# Patient Record
Sex: MALE | Race: BLACK OR AFRICAN AMERICAN | NOT HISPANIC OR LATINO | Employment: OTHER | URBAN - METROPOLITAN AREA
[De-identification: names, ages, dates, MRNs, and addresses within clinical notes are randomized per-mention and may not be internally consistent; named-entity substitution may affect disease eponyms.]

---

## 2018-05-08 ENCOUNTER — HISTORICAL (OUTPATIENT)
Dept: ADMINISTRATIVE | Facility: HOSPITAL | Age: 23
End: 2018-05-08

## 2018-05-08 LAB
BILIRUB SERPL-MCNC: NEGATIVE MG/DL
BLOOD URINE, POC: NEGATIVE
CLARITY, POC UA: CLEAR
COLOR, POC UA: NORMAL
GLUCOSE UR QL STRIP: NEGATIVE
HAV IGM SERPL QL IA: NONREACTIVE
HBV CORE IGM SERPL QL IA: NONREACTIVE
HBV SURFACE AG SERPL QL IA: NEGATIVE
HCV AB SERPL QL IA: NONREACTIVE
HIV 1+2 AB+HIV1 P24 AG SERPL QL IA: NONREACTIVE
KETONES UR QL STRIP: NEGATIVE
LEUKOCYTE EST, POC UA: NEGATIVE
NITRITE, POC UA: NEGATIVE
PH, POC UA: 5.5
PROTEIN, POC: NORMAL
SPECIFIC GRAVITY, POC UA: 1.03
T PALLIDUM AB SER QL: NONREACTIVE
UROBILINOGEN, POC UA: NORMAL

## 2018-08-21 ENCOUNTER — HISTORICAL (OUTPATIENT)
Dept: ADMINISTRATIVE | Facility: HOSPITAL | Age: 23
End: 2018-08-21

## 2018-08-21 LAB
APPEARANCE, UA: CLEAR
BACTERIA #/AREA URNS AUTO: ABNORMAL /[HPF]
BILIRUB SERPL-MCNC: NEGATIVE MG/DL
BILIRUB UR QL STRIP: NEGATIVE
BLOOD URINE, POC: NORMAL
CLARITY, POC UA: CLEAR
COLOR UR: YELLOW
COLOR, POC UA: YELLOW
GLUCOSE (UA): NORMAL
GLUCOSE UR QL STRIP: NEGATIVE
HAV IGM SERPL QL IA: NONREACTIVE
HBV CORE IGM SERPL QL IA: NONREACTIVE
HBV SURFACE AG SERPL QL IA: NEGATIVE
HCV AB SERPL QL IA: NONREACTIVE
HGB UR QL STRIP: NEGATIVE
HIV 1+2 AB+HIV1 P24 AG SERPL QL IA: NONREACTIVE
HYALINE CASTS #/AREA URNS LPF: ABNORMAL /[LPF]
KETONES UR QL STRIP: NEGATIVE
KETONES UR QL STRIP: NEGATIVE
LEUKOCYTE EST, POC UA: NEGATIVE
LEUKOCYTE ESTERASE UR QL STRIP: NEGATIVE
NITRITE UR QL STRIP: NEGATIVE
NITRITE, POC UA: NEGATIVE
PH UR STRIP: 5.5 [PH] (ref 4.5–8)
PH, POC UA: 6
PROT UR QL STRIP: 10 MG/DL
PROTEIN, POC: NEGATIVE
RBC #/AREA URNS AUTO: ABNORMAL /[HPF]
SP GR UR STRIP: 1.03 (ref 1–1.03)
SPECIFIC GRAVITY, POC UA: 1.03
SQUAMOUS #/AREA URNS LPF: ABNORMAL /[LPF]
T PALLIDUM AB SER QL: NONREACTIVE
UROBILINOGEN UR STRIP-ACNC: NORMAL
UROBILINOGEN, POC UA: NORMAL
WBC #/AREA URNS AUTO: ABNORMAL /HPF

## 2019-07-14 ENCOUNTER — HISTORICAL (OUTPATIENT)
Dept: ADMINISTRATIVE | Facility: HOSPITAL | Age: 24
End: 2019-07-14

## 2019-07-14 LAB
APPEARANCE, UA: CLEAR
BACTERIA #/AREA URNS AUTO: ABNORMAL /[HPF]
BILIRUB SERPL-MCNC: NEGATIVE MG/DL
BILIRUB UR QL STRIP: NEGATIVE
BLOOD URINE, POC: NORMAL
CLARITY, POC UA: NORMAL
COLOR UR: YELLOW
COLOR, POC UA: YELLOW
GLUCOSE (UA): NORMAL
GLUCOSE UR QL STRIP: NEGATIVE
HAV IGM SERPL QL IA: NONREACTIVE
HBV CORE IGM SERPL QL IA: NONREACTIVE
HBV SURFACE AG SERPL QL IA: NEGATIVE
HCV AB SERPL QL IA: NONREACTIVE
HGB UR QL STRIP: NEGATIVE
HIV 1+2 AB+HIV1 P24 AG SERPL QL IA: NONREACTIVE
HYALINE CASTS #/AREA URNS LPF: ABNORMAL /[LPF]
KETONES UR QL STRIP: NEGATIVE
KETONES UR QL STRIP: NORMAL
LEUKOCYTE EST, POC UA: NEGATIVE
LEUKOCYTE ESTERASE UR QL STRIP: NEGATIVE
NITRITE UR QL STRIP: NEGATIVE
NITRITE, POC UA: NEGATIVE
PH UR STRIP: 7 [PH] (ref 4.5–8)
PH, POC UA: 7
PROT UR QL STRIP: 20 MG/DL
PROTEIN, POC: NORMAL
RBC #/AREA URNS AUTO: ABNORMAL /[HPF]
RPR SER QL: REACTIVE
SP GR UR STRIP: 1.02 (ref 1–1.03)
SPECIFIC GRAVITY, POC UA: 1.02
SQUAMOUS #/AREA URNS LPF: ABNORMAL /[LPF]
T PALLIDUM AB SER QL: REACTIVE
UROBILINOGEN UR STRIP-ACNC: NORMAL
UROBILINOGEN, POC UA: NORMAL
WBC #/AREA URNS AUTO: ABNORMAL /HPF

## 2019-07-16 LAB — FINAL CULTURE: NO GROWTH

## 2019-10-01 ENCOUNTER — HISTORICAL (OUTPATIENT)
Dept: ADMINISTRATIVE | Facility: HOSPITAL | Age: 24
End: 2019-10-01

## 2019-10-01 LAB
BILIRUB SERPL-MCNC: NEGATIVE MG/DL
BLOOD URINE, POC: NORMAL
CLARITY, POC UA: CLEAR
COLOR, POC UA: YELLOW
GLUCOSE UR QL STRIP: NEGATIVE
KETONES UR QL STRIP: NEGATIVE
LEUKOCYTE EST, POC UA: NEGATIVE
NITRITE, POC UA: NEGATIVE
PH, POC UA: 5.5
PROTEIN, POC: NEGATIVE
RPR SER QL: REACTIVE
SPECIFIC GRAVITY, POC UA: 1.03
T PALLIDUM AB SER QL: REACTIVE

## 2019-12-17 ENCOUNTER — HISTORICAL (OUTPATIENT)
Dept: ADMINISTRATIVE | Facility: HOSPITAL | Age: 24
End: 2019-12-17

## 2019-12-17 LAB
BILIRUB SERPL-MCNC: NEGATIVE MG/DL
BLOOD URINE, POC: NEGATIVE
CLARITY, POC UA: CLEAR
COLOR, POC UA: COLORLESS
GLUCOSE UR QL STRIP: NEGATIVE
HAV IGM SERPL QL IA: NONREACTIVE
HBV CORE IGM SERPL QL IA: NONREACTIVE
HBV SURFACE AG SERPL QL IA: NEGATIVE
HCV AB SERPL QL IA: NONREACTIVE
HIV 1+2 AB+HIV1 P24 AG SERPL QL IA: NONREACTIVE
KETONES UR QL STRIP: NEGATIVE
LEUKOCYTE EST, POC UA: NEGATIVE
NITRITE, POC UA: NEGATIVE
PH, POC UA: 6
PROTEIN, POC: NEGATIVE
RPR SER QL: REACTIVE
SPECIFIC GRAVITY, POC UA: 1
T PALLIDUM AB SER QL: REACTIVE
UROBILINOGEN, POC UA: NORMAL

## 2021-12-20 ENCOUNTER — HISTORICAL (OUTPATIENT)
Dept: ADMINISTRATIVE | Facility: HOSPITAL | Age: 26
End: 2021-12-20

## 2021-12-20 LAB — SARS-COV-2 RNA RESP QL NAA+PROBE: NOT DETECTED

## 2022-04-11 ENCOUNTER — HISTORICAL (OUTPATIENT)
Dept: ADMINISTRATIVE | Facility: HOSPITAL | Age: 27
End: 2022-04-11

## 2022-04-29 VITALS
DIASTOLIC BLOOD PRESSURE: 66 MMHG | OXYGEN SATURATION: 99 % | SYSTOLIC BLOOD PRESSURE: 122 MMHG | WEIGHT: 174.19 LBS | SYSTOLIC BLOOD PRESSURE: 117 MMHG | BODY MASS INDEX: 26.22 KG/M2 | DIASTOLIC BLOOD PRESSURE: 75 MMHG | BODY MASS INDEX: 25.8 KG/M2 | HEIGHT: 69 IN | WEIGHT: 177 LBS | HEIGHT: 69 IN

## 2022-05-05 NOTE — HISTORICAL [facility]ER
This is a historical note converted from [employer]. Formatting and pictures may have been removed.  Please reference [employer] for original formatting and attached multimedia. Chief Complaint  Patient is c/o burning when he urination x days  History of Present Illness  23 year old BM with complaints of urethral irritation and dysuria x 2-3 days. No urethral discharge, genital skin lesions, scrotal/testicular pain. Denies abdominal or back pain. NO fever or chills. Mild achiness reported. No hematuria.  Patient having unprotected intercourse with 3 females. Symptoms started morning after last sexual encounter. No prior hx of STDs reported.  Review of Systems  GENERAL: Negative except as stated?in HPI  CV: Negative except as stated in HPI  RESP: Negative except as stated?in HPI  GI: Negative?except as stated in?HPI  : Negative?except as stated in?HPI  SKIN: Negative?except as stated in?HPI  Neuro: Negative?except as stated in?HPI  MS: Negative?except as stated in?HPI  Psych: Negative?except as stated in?HPI  Physical Exam  Vitals & Measurements  T:?36.7? ?C (Oral)? HR:?79(Peripheral)? BP:?122/66?  HT:?176?cm? WT:?79?kg? BMI:?25.5?  Vital Signs reviewed  GENERAL: Alert and oriented; no acute distress.  Head: Normocephalic, atraumatic.  Eyes: Pupils are round,?equal and?reactive to light. Extraocular movements intact. No exophthalmos. Sclera non-icteric.  Neck: Supple, nontender.? No lymphadenopathy.  CV: Normal rate and rhythm. No murmurs, rubs or gallops. ?No edema. Normal peripheral pulses and perfusion.  RESP: Respirations even and nonlabored.  GI: Abdomen soft and non distended. Normoactive bowel sounds x 4 quadrants. No tenderness with palpation.  MUSCULOSKELETAL: Full passive and active ROM of all joints. No bony deformities,?joint tenderness or swelling.?No CVA tenderness.  NEURO: Awake, alert and oriented to person, place, time and situation. Cranial nerves II-XII grossly intact.?No focal or sensory deficits  noted.  INTEGUMENTARY: Skin warm, dry, and intact. No rashes or?unusual bruising.  PSYCH: Appropriate mood and affect; cooperative.  Assessment/Plan  1.?Dysuria?R30.0  Urinalysis with?trace protein, 0-2 WBC and RBC?otherwise negative; urine C&S pending.?? Urine for?gonorrhea and chlamydia, HIV, RPR, Hepatitis panel pending; clinic will notify patient of these results. ?Zithromax 250 mg (mix with plain 1% lido) IM x 1; Zithromax 1 gram PO given in clinic. Patient tolerated well without adverse reaction. Abstain from sexual intercourse until all testing is back. Safe sex practices always. Follow up as needed.  Ordered:  Chlamydia trach and N. gonorrhea PCR, Stat collect, Urine, Collected, 07/14/19 14:12:00 CDT, Stop date 07/14/19 14:12:00 CDT, Nurse collect, Dysuria  [facility], 07/14/19 14:12:00 CDT, Blood, Collected, Stop date 07/14/19 14:12:00 CDT, Nurse collect, Dysuria, 07/14/19 14:12:00 CDT  HIV 1 and 2, Stat collect, 07/14/19 14:12:00 CDT, Blood, Collected, Stop date 07/14/19 14:12:00 CDT, Nurse collect, Dysuria, 07/14/19 14:12:00 CDT  Syphilis Antibody (with Reflex RPR), Stat collect, 07/14/19 14:12:00 CDT, Blood, Collected, Stop date 07/14/19 14:12:00 CDT, Nurse collect, Dysuria, 07/14/19 14:12:00 CDT  Urine Culture [id number], Stat collect, 07/14/19 14:13:00 CDT, Urine, Nurse collect, Stop date 07/14/19 14:13:00 CDT, Dysuria  ?  Orders:  injections IM/SQ, 07/14/19 14:32:00 CDT, 07/14/19 14:32:00 CDT   Problem List/Past Medical History  Ongoing  Tobacco user  Historical  No qualifying data  Medications  Flonase 50 mcg/inh nasal spray, 1 spray(s), Nasal, Daily,? ?Not taking  Allergies  No Known Allergies  Social History  Abuse/Neglect  No, No, 07/14/2019  Alcohol - Denies Alcohol Use, 01/26/2014  Current, 03/27/2019  Current, Beer, Wine, Liquor, 3-5 times per week, 05/08/2018  Substance Use - Denies Substance Abuse, 01/26/2014  Current, Marijuana, 03/27/2019  Past, 08/21/2018  Tobacco -  Medium Risk, 01/26/2014  4 or less cigarettes(less than 1/4 pack)/day in last 30 days, No, 07/14/2019  Never (less than 100 in lifetime), N/A, 03/27/2019  Current every day smoker, Cigarettes, 10 per day., 05/08/2018  Health Maintenance  Health Maintenance  ???Pending?(in the next year)  ??? ??OverDue  ??? ? ? ?Alcohol Misuse Screening due??01/01/19??and every 1??year(s)  ??? ? ? ?Smoking Cessation due??01/01/19??and every 1??year(s)  ??? ??Due?  ??? ? ? ?ADL Screening due??07/14/19??and every 1??year(s)  ??? ? ? ?Diabetes Screening due??07/14/19??and every?  ??? ? ? ?Tetanus Vaccine due??07/14/19??and every 10??year(s)  ??? ??Due In Future?  ??? ? ? ?Obesity Screening not due until??01/01/20??and every 1??year(s)  ??? ? ? ?Blood Pressure Screening not due until??07/13/20??and every 1??year(s)  ??? ? ? ?Body Mass Index Check not due until??07/13/20??and every 1??year(s)  ??? ? ? ?Depression Screening not due until??07/13/20??and every 1??year(s)  ???Satisfied?(in the past 1 year)  ??? ??Satisfied?  ??? ? ? ?Blood Pressure Screening on??07/14/19.??Satisfied by Gretta MA, Ryann  ??? ? ? ?Body Mass Index Check on??07/14/19.??Satisfied by Gretta MA, Ryann  ??? ? ? ?Depression Screening on??07/14/19.??Satisfied by Gretta MA, Ryann  ??? ? ? ?Influenza Vaccine on??03/27/19.??Satisfied by Gretta Xochitl KING  ??? ? ? ?Obesity Screening on??07/14/19.??Satisfied by Gretta MA, Ryann  ??? ? ? ?Smoking Cessation on??08/26/18.??Satisfied by Tarun VEGA, Dc Scales  ?  Lab Results  Test Name Test Result Date/Time   UA Appear Clear 07/14/2019 14:15 CDT   UA Color YELLOW 07/14/2019 14:15 CDT   UA Spec Grav 1.023 07/14/2019 14:15 CDT   UA Bili Negative 07/14/2019 14:15 CDT   UA pH 7.0 07/14/2019 14:15 CDT   UA Urobilinogen Normal 07/14/2019 14:15 CDT   UA Blood Negative 07/14/2019 14:15 CDT   UA Glucose Normal 07/14/2019 14:15 CDT   UA Ketones Negative 07/14/2019 14:15 CDT   UA Protein 20 (Abnormal) 07/14/2019 14:15 CDT   UA  Nitrite Negative 07/14/2019 14:15 CDT   UA Leuk Est Negative 07/14/2019 14:15 CDT   UA WBC Interp 0-2 07/14/2019 14:15 CDT   UA RBC Interp 0-2 07/14/2019 14:15 CDT   UA Bact Interp None Seen 07/14/2019 14:15 CDT   UA Squam Epi Interp 2-20 07/14/2019 14:15 CDT   UA Hyal Cast Interp 26-50 (Abnormal) 07/14/2019 14:15 CDT

## 2022-11-02 ENCOUNTER — HOSPITAL ENCOUNTER (EMERGENCY)
Facility: HOSPITAL | Age: 27
Discharge: HOME | End: 2022-11-02
Attending: FAMILY MEDICINE
Payer: MEDICAID

## 2022-11-02 VITALS
OXYGEN SATURATION: 100 % | HEIGHT: 69 IN | RESPIRATION RATE: 16 BRPM | SYSTOLIC BLOOD PRESSURE: 161 MMHG | DIASTOLIC BLOOD PRESSURE: 74 MMHG | BODY MASS INDEX: 33.39 KG/M2 | WEIGHT: 225.44 LBS | TEMPERATURE: 99 F | HEART RATE: 100 BPM

## 2022-11-02 DIAGNOSIS — Z13.9 ENCOUNTER FOR MEDICAL SCREENING EXAMINATION: Primary | ICD-10-CM

## 2022-11-02 LAB
APPEARANCE UR: CLEAR
BACTERIA #/AREA URNS AUTO: ABNORMAL /HPF
BILIRUB UR QL STRIP.AUTO: NEGATIVE MG/DL
C TRACH DNA SPEC QL NAA+PROBE: NOT DETECTED
COLOR UR AUTO: ABNORMAL
GLUCOSE UR QL STRIP.AUTO: NORMAL MG/DL
HYALINE CASTS #/AREA URNS LPF: ABNORMAL /LPF
KETONES UR QL STRIP.AUTO: ABNORMAL MG/DL
LEUKOCYTE ESTERASE UR QL STRIP.AUTO: NEGATIVE UNIT/L
MUCOUS THREADS URNS QL MICRO: ABNORMAL /LPF
N GONORRHOEA DNA SPEC QL NAA+PROBE: NOT DETECTED
NITRITE UR QL STRIP.AUTO: NEGATIVE
PH UR STRIP.AUTO: 6 [PH]
PROT UR QL STRIP.AUTO: NEGATIVE MG/DL
RBC #/AREA URNS AUTO: ABNORMAL /HPF
RBC UR QL AUTO: ABNORMAL UNIT/L
SP GR UR STRIP.AUTO: 1.03
SQUAMOUS #/AREA URNS LPF: ABNORMAL /HPF
T VAGINALIS URNS QL MICRO: ABNORMAL /HPF
UROBILINOGEN UR STRIP-ACNC: NORMAL MG/DL
WBC #/AREA URNS AUTO: ABNORMAL /HPF

## 2022-11-02 PROCEDURE — 87491 CHLMYD TRACH DNA AMP PROBE: CPT | Performed by: PHYSICIAN ASSISTANT

## 2022-11-02 PROCEDURE — 99283 EMERGENCY DEPT VISIT LOW MDM: CPT

## 2022-11-02 PROCEDURE — 81001 URINALYSIS AUTO W/SCOPE: CPT | Performed by: PHYSICIAN ASSISTANT

## 2022-11-02 PROCEDURE — 87591 N.GONORRHOEAE DNA AMP PROB: CPT | Performed by: PHYSICIAN ASSISTANT

## 2022-11-02 RX ORDER — METRONIDAZOLE 500 MG/1
500 TABLET ORAL EVERY 12 HOURS
Qty: 14 TABLET | Refills: 0 | Status: SHIPPED | OUTPATIENT
Start: 2022-11-02 | End: 2022-11-09

## 2022-11-02 NOTE — ED PROVIDER NOTES
Encounter Date: 11/2/2022       History     Chief Complaint   Patient presents with    Exposure to STD     C/o exposure to trich. Would like to be checked for STDS.      26-year-old male who presents to the emergency department stating he has been exposed to Trichomonas and with like to be checked for STDs.  He denies having any symptoms. He denies urethral discharge, dysuria, fever, chills, abdominal pain    The history is provided by the patient. No  was used.   Review of patient's allergies indicates:  No Known Allergies  No past medical history on file.  No past surgical history on file.  No family history on file.     Review of Systems   Constitutional:  Negative for chills and fever.   Eyes:  Negative for pain, discharge, redness and itching.   Respiratory:  Negative for cough and shortness of breath.    Cardiovascular:  Negative for chest pain and palpitations.   Gastrointestinal:  Negative for abdominal pain, nausea and vomiting.   Genitourinary:  Negative for dysuria, flank pain, genital sores, hematuria, penile discharge, penile pain, penile swelling, scrotal swelling and testicular pain.   Musculoskeletal:  Negative for back pain and neck pain.   Skin:  Negative for rash.   Neurological:  Negative for dizziness, weakness, light-headedness and headaches.   Hematological:  Negative for adenopathy. Does not bruise/bleed easily.     Physical Exam     Initial Vitals [11/02/22 1815]   BP Pulse Resp Temp SpO2   (!) 161/74 100 16 98.5 °F (36.9 °C) 100 %      MAP       --         Physical Exam    Nursing note and vitals reviewed.  Constitutional: He appears well-developed and well-nourished.   HENT:   Nose: Nose normal.   Mouth/Throat: Oropharynx is clear and moist.   Eyes: Conjunctivae are normal.   Neck: Neck supple.   Normal range of motion.  Cardiovascular:  Normal rate, normal heart sounds and intact distal pulses.           Pulmonary/Chest: Breath sounds normal.   Abdominal: Abdomen is  soft. Bowel sounds are normal.   Musculoskeletal:         General: Normal range of motion.      Cervical back: Normal range of motion and neck supple.     Lymphadenopathy:     He has no cervical adenopathy.   Neurological: He is alert. GCS score is 15. GCS eye subscore is 4. GCS verbal subscore is 5. GCS motor subscore is 6.   Skin: Skin is warm. Capillary refill takes less than 2 seconds. No rash noted. No erythema.       ED Course   Procedures  Labs Reviewed   URINALYSIS, REFLEX TO URINE CULTURE - Abnormal; Notable for the following components:       Result Value    Ketones, UA Trace (*)     Blood, UA Trace (*)     Mucous, UA Trace (*)     All other components within normal limits   CHLAMYDIA/GONORRHOEAE(GC), PCR          Imaging Results    None          Medications - No data to display  Medical Decision Making:   Clinical Tests:   Lab Tests: Ordered and Reviewed  ED Management:  The patient is resting comfortably and in no acute distress. I personally discussed his test results and treatment plan.  Gave strict ED precautions and specific conditions for return to the emergency department and importance of follow up with pcp.  Patient voices understanding and agrees to the plan discussed. All patients' questions have been answered at this time. He has remained hemodynamically stable throughout entire stay in ED and is stable for discharge home.            ED Course as of 11/02/22 1939 Wed Nov 02, 2022   1918 Trichomonas, UA: None Seen [ER]      ED Course User Index  [ER] DARVIN Dolan                 Clinical Impression:   Final diagnoses:  [Z13.9] Encounter for medical screening examination (Primary)      ED Disposition Condition    Discharge Stable          ED Prescriptions       Medication Sig Dispense Start Date End Date Auth. Provider    metroNIDAZOLE (FLAGYL) 500 MG tablet Take 1 tablet (500 mg total) by mouth every 12 (twelve) hours. for 7 days 14 tablet 11/2/2022 11/9/2022 DARVIN Dolan           Follow-up Information       Follow up With Specialties Details Why Contact Info    [facility] Dept Emergency Medicine  As needed, If symptoms worsen [address] [facility] [address] [phone]             DARVIN Dolan  11/02/22 1939

## 2025-07-09 ENCOUNTER — HOSPITAL ENCOUNTER (EMERGENCY)
Facility: HOSPITAL | Age: 30
Discharge: HOME | End: 2025-07-09
Attending: FAMILY MEDICINE

## 2025-07-09 VITALS
TEMPERATURE: 98 F | HEIGHT: 68 IN | WEIGHT: 239.63 LBS | HEART RATE: 76 BPM | BODY MASS INDEX: 36.32 KG/M2 | RESPIRATION RATE: 16 BRPM | SYSTOLIC BLOOD PRESSURE: 137 MMHG | OXYGEN SATURATION: 99 % | DIASTOLIC BLOOD PRESSURE: 78 MMHG

## 2025-07-09 DIAGNOSIS — H60.332 ACUTE SWIMMER'S EAR OF LEFT SIDE: Primary | ICD-10-CM

## 2025-07-09 DIAGNOSIS — H65.02 NON-RECURRENT ACUTE SEROUS OTITIS MEDIA OF LEFT EAR: ICD-10-CM

## 2025-07-09 PROCEDURE — 99284 EMERGENCY DEPT VISIT MOD MDM: CPT

## 2025-07-09 RX ORDER — AMOXICILLIN AND CLAVULANATE POTASSIUM 875; 125 MG/1; MG/1
1 TABLET, FILM COATED ORAL 2 TIMES DAILY
Qty: 14 TABLET | Refills: 0 | Status: SHIPPED | OUTPATIENT
Start: 2025-07-09

## 2025-07-09 RX ORDER — OFLOXACIN 3 MG/ML
3 SOLUTION AURICULAR (OTIC) 2 TIMES DAILY
Qty: 5 ML | Refills: 0 | Status: SHIPPED | OUTPATIENT
Start: 2025-07-09 | End: 2025-07-16

## 2025-07-09 NOTE — ED PROVIDER NOTES
Encounter Date: 7/9/2025       History     Chief Complaint   Patient presents with    Otalgia     Reports L ear pain since Monday that is causing headaches.      29-year-old presents with left ear pain since Monday no fevers no chills flu-like it is canal swollen vital signs stable on exam appears to have some external otitis starting with some otitis media behind it we will go ahead and get him started on some medicines have him follow up with PCP in 10 days patient understands agrees with the plan        Review of patient's allergies indicates:  No Known Allergies  History reviewed. No pertinent past medical history.  History reviewed. No pertinent surgical history.  No family history on file.  Social History[1]  Review of Systems   HENT:  Positive for ear discharge and ear pain.    All other systems reviewed and are negative.      Physical Exam     Initial Vitals [07/09/25 1140]   BP Pulse Resp Temp SpO2   (!) 152/93 86 20 97.7 °F (36.5 °C) 96 %      MAP       --         Physical Exam    Nursing note reviewed.  Constitutional: He appears well-developed and well-nourished. He is active.   HENT:   Head: Normocephalic and atraumatic.   Nose: Nose normal. Mouth/Throat: Oropharynx is clear and moist.   Swollen canal with a otitis media in left ear   Eyes: Conjunctivae, EOM and lids are normal.   Neck: Trachea normal and phonation normal. Neck supple. No thyroid mass present.   Normal range of motion.  Cardiovascular:  Normal rate, regular rhythm, normal heart sounds and normal pulses.           Pulmonary/Chest: Breath sounds normal.   Abdominal: Abdomen is soft. Bowel sounds are normal.   Musculoskeletal:         General: Normal range of motion.      Cervical back: Normal range of motion and neck supple.     Neurological: He is alert and oriented to person, place, and time. He has normal strength and normal reflexes.   Skin: Skin is warm and intact.   Psychiatric: He has a normal mood and affect. His speech is normal  and behavior is normal. Judgment and thought content normal. Cognition and memory are normal.         ED Course   Procedures  Labs Reviewed - No data to display       Imaging Results    None          Medications - No data to display  Medical Decision Making  29-year-old presents with left ear pain since Monday no fevers no chills flu-like it is canal swollen vital signs stable on exam appears to have some external otitis starting with some otitis media behind it we will go ahead and get him started on some medicines have him follow up with PCP in 10 days patient understands agrees with the plan          Risk  Prescription drug management.  Risk Details: Differential diagnosis otitis media otitis externa cerumen impaction                                      Clinical Impression:  Final diagnoses:  [H60.332] Acute swimmer's ear of left side (Primary)  [H65.02] Non-recurrent acute serous otitis media of left ear          ED Disposition Condition    Discharge Stable          ED Prescriptions       Medication Sig Dispense Start Date End Date Auth. Provider    ofloxacin (FLOXIN) 0.3 % otic solution Place 3 drops into the left ear 2 (two) times daily. for 7 days 5 mL 7/9/2025 7/16/2025 Larry Cui MD    amoxicillin-clavulanate 875-125mg (AUGMENTIN) 875-125 mg per tablet Take 1 tablet by mouth 2 (two) times daily. 14 tablet 7/9/2025 -- Larry Cui MD          Follow-up Information       Follow up With Specialties Details Why Contact Info    [facility] Emergency Medicine  As needed [address] [facility] [address] [phone]                   [1]   Social History  Tobacco Use    Smoking status: Never    Smokeless tobacco: Never   Substance Use Topics    Alcohol use: Not Currently    Drug use: Never        Larry Cui MD  07/09/25 7708